# Patient Record
Sex: MALE | Race: BLACK OR AFRICAN AMERICAN | Employment: FULL TIME | URBAN - METROPOLITAN AREA
[De-identification: names, ages, dates, MRNs, and addresses within clinical notes are randomized per-mention and may not be internally consistent; named-entity substitution may affect disease eponyms.]

---

## 2018-09-10 ENCOUNTER — OFFICE VISIT (OUTPATIENT)
Dept: FAMILY MEDICINE | Facility: CLINIC | Age: 53
End: 2018-09-10
Payer: COMMERCIAL

## 2018-09-10 VITALS
OXYGEN SATURATION: 100 % | HEART RATE: 61 BPM | BODY MASS INDEX: 27.74 KG/M2 | WEIGHT: 183 LBS | RESPIRATION RATE: 18 BRPM | DIASTOLIC BLOOD PRESSURE: 74 MMHG | HEIGHT: 68 IN | TEMPERATURE: 98.8 F | SYSTOLIC BLOOD PRESSURE: 125 MMHG

## 2018-09-10 DIAGNOSIS — M25.531 RIGHT WRIST PAIN: Primary | ICD-10-CM

## 2018-09-10 DIAGNOSIS — Z23 NEED FOR PROPHYLACTIC VACCINATION WITH TETANUS-DIPHTHERIA (TD): ICD-10-CM

## 2018-09-10 PROCEDURE — 99214 OFFICE O/P EST MOD 30 MIN: CPT | Performed by: PHYSICIAN ASSISTANT

## 2018-09-10 RX ORDER — DICLOFENAC SODIUM 75 MG/1
75 TABLET, DELAYED RELEASE ORAL 2 TIMES DAILY PRN
Qty: 60 TABLET | Refills: 1 | Status: SHIPPED | OUTPATIENT
Start: 2018-09-10 | End: 2020-02-02

## 2018-09-10 NOTE — PROGRESS NOTES
"  SUBJECTIVE:                                                    Carlos Enrique Rousseau is a 53 year old male who presents to clinic today for the following health issues:    Wrist/hand Pain    Onset: x 2-3 weeks    Description:   Location: R hand/wrist  Character: Sharp, Dull ache and Stabbing    Intensity: moderate    Progression of Symptoms: worse    Accompanying Signs & Symptoms:  Other symptoms: numbness and swelling    History:   Previous similar pain: YES      Precipitating factors:   Trauma or overuse: YES- possibly unsure per pt maybe work?     Alleviating factors:  Improved by: rest/inactivity, heat, ice, immobilization, massage, acetaminophen and Ibuprofen    Therapies Tried and outcome: Noted above but really doesn't help with SX.        No known injury. Patient has had pain for a long time but now is so bad he had to miss work last Thursday.  Hurts a lot more with movement.  Has not used a brace.  Most pain is located in wrist towards the thumb. He feels nerve-type pain with \"shooting\" pains into his hand.  He feels his  strength is decreased also.  Needs not for work.  Is supposed to lift heavy things and would need a note to be able to wear a brace. Patient would like to see a specialist because the pain is so bad. He has been taking tylenol and advil.  He doesn't like taking medications so frequently, will have him stop advil and change to diclofenec.   [place]one [place] in the past year. No concerns.     No fevers,erythema or evidence of gout/infection.     Problem list and histories reviewed & adjusted, as indicated.  Additional history: as documented    Patient Active Problem List   Diagnosis     Thumb numbness     Past Surgical History:   Procedure Laterality Date     APPENDECTOMY         Social History   Substance Use Topics     Smoking status: Former Smoker     Packs/day: 0.50     Years: 10.00     Smokeless tobacco: Never Used     Alcohol use No     Family History   Problem Relation Age of " "Onset     Diabetes Mother      C.A.D. No family hx of      Hypertension No family hx of      Cerebrovascular Disease No family hx of          Current Outpatient Prescriptions   Medication Sig Dispense Refill     Acetaminophen (TYLENOL) 325 MG CAPS Take 325-650 mg by mouth every 4 hours as needed       diclofenac (VOLTAREN) 75 MG EC tablet Take 1 tablet (75 mg) by mouth 2 times daily as needed for moderate pain With food. 60 tablet 1     ibuprofen 200 MG capsule Take 200 mg by mouth every 4 hours as needed for fever       No Known Allergies    ROS:  Constitutional, HEENT, cardiovascular, pulmonary, GI, , musculoskeletal, neuro, skin, endocrine and psych systems are negative, except as otherwise noted.    OBJECTIVE:     /74  Pulse 61  Temp 98.8  F (37.1  C) (Oral)  Resp 18  Ht 5' 8\" (1.727 m)  Wt 183 lb (83 kg)  SpO2 100%  BMI 27.83 kg/m2  Body mass index is 27.83 kg/(m^2).  GENERAL: healthy, alert and no distress  RESP: lungs clear to auscultation - no rales, rhonchi or wheezes  CV: regular rate and rhythm, normal S1 S2, no S3 or S4, no murmur, click or rub, no peripheral edema and peripheral pulses strong  MS and neuro: {:tender R wrist in general.  No gross abnormalities except for one finger is malformed but per patient that was from an injury when he was 4 years old.   strength decreased right versus left. tinels negative for carpel tunnel. Unable to perform radial deviation at the wrist, ulnar is also painful for him. Cap refill and pulses and temp appropriate.  No crepitus alone dequervans region.   SKIN: no suspicious lesions or rashes      ASSESSMENT/PLAN:     ASSESSMENT / PLAN:  (M25.181) Right wrist pain  (primary encounter diagnosis)  Comment: carpel tunnel versus arthritis versus tendonitis or multiple issues also possible. Will have him wear brace (given today) to minimize movement at wrist and thumb.  He is to wear this as much as possible. Letter given for work. Scheduled with " specialist Friday.   Plan: ORTHOPEDICS ADULT REFERRAL, diclofenac         (VOLTAREN) 75 MG EC tablet--take with food side effects discussed                Billin min spent face-to-face with patient. 20 min on history, 4 on exam, 1 on discussing diagnosis and treatment plan.     Kayla Velazquez PA-C  [facility]

## 2018-09-10 NOTE — NURSING NOTE
"Chief Complaint   Patient presents with     Musculoskeletal Problem     R Hand/wrist pain per pt x 2-3 weeks no known injury     Health Maintenance     orders pended       Initial /74  Pulse 61  Temp 98.8  F (37.1  C) (Oral)  Resp 18  Ht 5' 8\" (1.727 m)  Wt 183 lb (83 kg)  SpO2 100%  BMI 27.83 kg/m2 Estimated body mass index is 27.83 kg/(m^2) as calculated from the following:    Height as of this encounter: 5' 8\" (1.727 m).    Weight as of this encounter: 183 lb (83 kg).  Medication Reconciliation: complete      Diane Contreras MA    "

## 2018-09-10 NOTE — MR AVS SNAPSHOT
After Visit Summary   9/10/2018    Carlos Enrique Rousseau    MRN: [id number]           Patient Information     Date Of Birth          1965        Visit Information        Provider Department      9/10/2018 1:00 PM Kayla Velazquez PA-C [facility]        Today's Diagnoses     Right wrist pain    -  1    Need for prophylactic vaccination with tetanus-diphtheria (TD)           Follow-ups after your visit        Additional Services     ORTHOPEDICS ADULT REFERRAL       Your provider has referred you to: [facility]: [facility] [phone]   [email or web address]    Please be aware that coverage of these services is subject to the terms and limitations of your health insurance plan.  Call member services at your health plan with any benefit or coverage questions.      Please bring the following to your appointment:    >>   Any x-rays, CTs or MRIs which have been performed.  Contact the facility where they were done to arrange for  prior to your scheduled appointment.    >>   List of current medications   >>   This referral request   >>   Any documents/labs given to you for this referral                  Your next 10 appointments already scheduled     Sep 14, 2018  8:15 AM CDT   New Visit with Clay Wooten MD   [facility] ([facility] [address] [address] [phone]              Who to contact     If you have questions or need follow up information about today's clinic visit or your schedule please contact [facility] directly at [phone].  Normal or non-critical lab and imaging results will be communicated to you by MyChart, letter or phone within 4 business days after the clinic has received the results. If you do not hear from us within 7 days, please contact the clinic through MyChart or phone. If you have a critical or abnormal lab result, we will notify you  "by phone as soon as possible.  Submit refill requests through [employer] or call your pharmacy and they will forward the refill request to us. Please allow 3 business days for your refill to be completed.          Additional Information About Your Visit        Care EveryWhere ID     This is your Care EveryWhere ID. This could be used by other organizations to access your [place] medical records  [id number]        Your Vitals Were     Pulse Temperature Respirations Height Pulse Oximetry BMI (Body Mass Index)    61 98.8  F (37.1  C) (Oral) 18 5' 8\" (1.727 m) 100% 27.83 kg/m2       Blood Pressure from Last 3 Encounters:   09/10/18 125/74   03/17/10 133/73    Weight from Last 3 Encounters:   09/10/18 183 lb (83 kg)   06/11/15 175 lb (79.4 kg)   03/17/10 163 lb (73.9 kg)              We Performed the Following     ORTHOPEDICS ADULT REFERRAL          Today's Medication Changes          These changes are accurate as of 9/10/18  1:29 PM.  If you have any questions, ask your nurse or doctor.               Start taking these medicines.        Dose/Directions    diclofenac 75 MG EC tablet   Commonly known as:  VOLTAREN   Used for:  Right wrist pain   Started by:  Kayla Velazquez PA-C        Dose:  75 mg   Take 1 tablet (75 mg) by mouth 2 times daily as needed for moderate pain With food.   Quantity:  60 tablet   Refills:  1            Where to get your medicines      These medications were sent to [facility] [address] [place] [facility] [address] [facility]  [address] [address]     Phone:  [phone]     diclofenac 75 MG EC tablet                Primary Care Provider Office Phone # Fax #    [facility] [phone] [address] [address] [address]        Equal Access to Services     EROS MARRERO : Kinza Buchanan, cintia love, manish saenz, claudia curtis. So [facility] [phone].    ATENCIÓN: Si habla " español, tiene a murphy disposición servicios gratuitos de asistencia lingüística. Fran epps [phone].    We comply with applicable federal civil rights laws and [place] laws. We do not discriminate on the basis of race, color, national origin, age, disability, sex, sexual orientation, or gender identity.            Thank you!     Thank you for choosing [facility]  for your care. Our goal is always to provide you with excellent care. Hearing back from our patients is one way we can continue to improve our services. Please take a few minutes to complete the written survey that you may receive in the mail after your visit with us. Thank you!             Your Updated Medication List - Protect others around you: Learn how to safely use, store and throw away your medicines at www.disposemymeds.org.          This list is accurate as of 9/10/18  1:29 PM.  Always use your most recent med list.                   Brand Name Dispense Instructions for use Diagnosis    diclofenac 75 MG EC tablet    VOLTAREN    60 tablet    Take 1 tablet (75 mg) by mouth 2 times daily as needed for moderate pain With food.    Right wrist pain       ibuprofen 200 MG capsule      Take 200 mg by mouth every 4 hours as needed for fever        TYLENOL 325 MG Caps   Generic drug:  Acetaminophen      Take 325-650 mg by mouth every 4 hours as needed

## 2018-09-10 NOTE — LETTER
[facility]  [address] [facility] [address]  Phone: [phone]    September 10, 2018        Carlos Enrique Rousseau  [address] [place] [address]          To whom it may concern:    RE: Carlos Enrique Rousseau    Patient was seen and treated today at our clinic and missed work for acute illness.  They may be excused Sept 6th (half day)  And today (full day).  He may return to work tomorrow but needs to wear a wrist support brace while working.  He should also not lift more than 25 pounds.  These restrictions are good until further evaluated by specialist.     Please contact me for questions or concerns.      Sincerely,        Kayla Velazquez PA-C

## 2018-09-14 ENCOUNTER — OFFICE VISIT (OUTPATIENT)
Dept: ORTHOPEDICS | Facility: CLINIC | Age: 53
End: 2018-09-14
Payer: COMMERCIAL

## 2018-09-14 ENCOUNTER — RADIANT APPOINTMENT (OUTPATIENT)
Dept: GENERAL RADIOLOGY | Facility: CLINIC | Age: 53
End: 2018-09-14
Attending: ORTHOPAEDIC SURGERY
Payer: COMMERCIAL

## 2018-09-14 VITALS
OXYGEN SATURATION: 97 % | HEIGHT: 68 IN | SYSTOLIC BLOOD PRESSURE: 130 MMHG | HEART RATE: 67 BPM | BODY MASS INDEX: 27.86 KG/M2 | WEIGHT: 183.8 LBS | DIASTOLIC BLOOD PRESSURE: 90 MMHG

## 2018-09-14 DIAGNOSIS — M25.531 RIGHT WRIST PAIN: ICD-10-CM

## 2018-09-14 DIAGNOSIS — M25.531 RIGHT WRIST PAIN: Primary | ICD-10-CM

## 2018-09-14 DIAGNOSIS — M19.031 ARTHRITIS OF RIGHT WRIST: ICD-10-CM

## 2018-09-14 PROCEDURE — 99203 OFFICE O/P NEW LOW 30 MIN: CPT | Mod: 25 | Performed by: ORTHOPAEDIC SURGERY

## 2018-09-14 PROCEDURE — 20605 DRAIN/INJ JOINT/BURSA W/O US: CPT | Mod: RT | Performed by: ORTHOPAEDIC SURGERY

## 2018-09-14 PROCEDURE — 73110 X-RAY EXAM OF WRIST: CPT | Mod: RT

## 2018-09-14 RX ORDER — IBUPROFEN 800 MG/1
800 TABLET, FILM COATED ORAL
COMMUNITY
Start: 2018-04-17

## 2018-09-14 RX ADMIN — TRIAMCINOLONE ACETONIDE 40 MG: 40 INJECTION, SUSPENSION INTRA-ARTICULAR; INTRAMUSCULAR at 09:02

## 2018-09-14 RX ADMIN — LIDOCAINE HYDROCHLORIDE 0.5 ML: 10 INJECTION, SOLUTION INFILTRATION; PERINEURAL at 09:02

## 2018-09-14 ASSESSMENT — PAIN SCALES - GENERAL: PAINLEVEL: EXTREME PAIN (9)

## 2018-09-14 NOTE — MR AVS SNAPSHOT
After Visit Summary   9/14/2018    aCrlos Enrique Rousseau    MRN: [id number]           Patient Information     Date Of Birth          1965        Visit Information        Provider Department      9/14/2018 8:15 AM Clay Wooten MD [facility]        Today's Diagnoses     Right wrist pain    -  1    Arthritis of right wrist           Follow-ups after your visit        Additional Services     [facility] PT, HAND, AND CHIROPRACTIC REFERRAL       **This order will print in the [facility] Scheduling Office**    Physical Therapy, Hand Therapy and Chiropractic Care are available through:    *[facility]  *[facility]  *[facility]    Call one number to schedule at any of the above locations: [phone].    Your provider has referred you to: Hand Therapy    Indication/Reason for Referral: Wrist Pain  Onset of Illness: years  Therapy Orders: Evaluate and Treat  Special Programs: None  Special Request: None    Diana Ortiz      Additional Comments for the Therapist or Chiropractor:     Please be aware that coverage of these services is subject to the terms and limitations of your health insurance plan.  Call member services at your health plan with any benefit or coverage questions.      Please bring the following to your appointment:    *Your personal calendar for scheduling future appointments  *Comfortable clothing                  Follow-up notes from your care team     Return if symptoms worsen or fail to improve.      Who to contact     If you have questions or need follow up information about today's clinic visit or your schedule please contact [facility] directly at [phone].  Normal or non-critical lab and imaging results will be communicated to you by MyChart, letter or phone within 4 business days after the clinic has received the results. If you do not hear from us within 7 days, please contact the clinic through [employer]  "or phone. If you have a critical or abnormal lab result, we will notify you by phone as soon as possible.  Submit refill requests through [employer] or call your pharmacy and they will forward the refill request to us. Please allow 3 business days for your refill to be completed.          Additional Information About Your Visit        Care EveryWhere ID     This is your Care EveryWhere ID. This could be used by other organizations to access your [place] medical records  [id number]        Your Vitals Were     Pulse Height Pulse Oximetry BMI (Body Mass Index)          67 5' 8\" (1.727 m) 97% 27.95 kg/m2         Blood Pressure from Last 3 Encounters:   09/14/18 130/90   09/10/18 125/74   03/17/10 133/73    Weight from Last 3 Encounters:   09/14/18 183 lb 12.8 oz (83.4 kg)   09/10/18 183 lb (83 kg)   06/11/15 175 lb (79.4 kg)              We Performed the Following     Arthrocentesis     [facility] PT, HAND, AND CHIROPRACTIC REFERRAL        Primary Care Provider Office Phone # Fax #    [facility] [phone] [address] [address] [facility] [address]        Equal Access to Services     EROS MARRERO : Hadii elise ku hadasho Soomaali, waaxda luqadaha, qaybta kaalmada adeegyada, claudia curtis. So [facility] [phone].    ATENCIÓN: Si habla español, tiene a murphy disposición servicios gratuitos de asistencia lingüística. Fran al [phone].    We comply with applicable federal civil rights laws and [place] laws. We do not discriminate on the basis of race, color, national origin, age, disability, sex, sexual orientation, or gender identity.            Thank you!     Thank you for choosing [facility]  for your care. Our goal is always to provide you with excellent care. Hearing back from our patients is one way we can continue to improve our services. Please take a few minutes to complete the written survey that you may receive in the mail after your visit with us. " Thank you!             Your Updated Medication List - Protect others around you: Learn how to safely use, store and throw away your medicines at www.disposemymeds.org.          This list is accurate as of 9/14/18 11:59 PM.  Always use your most recent med list.                   Brand Name Dispense Instructions for use Diagnosis    diclofenac 75 MG EC tablet    VOLTAREN    60 tablet    Take 1 tablet (75 mg) by mouth 2 times daily as needed for moderate pain With food.    Right wrist pain       * ibuprofen 200 MG capsule      Take 200 mg by mouth every 4 hours as needed for fever        * ibuprofen 800 MG tablet    ADVIL/MOTRIN     Take 800 mg by mouth        TYLENOL 325 MG Caps   Generic drug:  Acetaminophen      Take 325-650 mg by mouth every 4 hours as needed        * Notice:  This list has 2 medication(s) that are the same as other medications prescribed for you. Read the directions carefully, and ask your doctor or other care provider to review them with you.

## 2018-09-14 NOTE — PROGRESS NOTES
"Chief Complaint:   Chief Complaint   Patient presents with     Right Wrist - Pain     Onset: 6 months. NKI. Patient states his wrist is painful and will lock up. Sometimes the pain will affect his fingers. Sometimes he will have swelling to the point he cannot bend his wrist. He is wearing a thumb spica brace that helps.       Carlos Enrique Rousseau is seen today in the [facility] for evaluation of right wrist pain at the request of Kayla Velazquez PA-C        HPI: Carlos Enrique Rousseau is a 53 year old male , right -hand dominant, who presents for evaluation and management of a right wrist pain, no known injury. Pain has been present for 6 months. Since that time, pain has progressed. Pain will radiate into his hand and fingers. He will get swelling in the wrist and is stiff. At times he feels his wrist \"locks up\". Has now been wearing a brace x4 days that seems to help.       Per EMR, right wrist pain back in 2014 and noted to have significant arthritis at that time.      He reports having severe pain/discomfort around the wrist site. He denies associated numbness or tingling. He denies any other injuries to his upper extremity.   Symptoms: pain, swelling, stiffness.  Location of symptoms: wrist, fingers.  Pain severity: 9/10  Pain quality: sharp and shooting  Frequency of symptoms: are constant  Aggravating factors: activities, usage, work.  Relieving factors: wrist brace..    Previous treatment: none.    Past medical history:  has a past medical history of NO ACTIVE PROBLEMS.   Patient Active Problem List    Diagnosis Date Noted     Thumb numbness 06/11/2015     Priority: Medium       Past surgical history:  has a past surgical history that includes appendectomy.     Medications:    Current Outpatient Prescriptions   Medication Sig Dispense Refill     Acetaminophen (TYLENOL) 325 MG CAPS Take 325-650 mg by mouth every 4 hours as needed       diclofenac (VOLTAREN) 75 MG EC tablet Take 1 tablet (75 mg) " "by mouth 2 times daily as needed for moderate pain With food. 60 tablet 1     ibuprofen 200 MG capsule Take 200 mg by mouth every 4 hours as needed for fever          Allergies:   No Known Allergies     Family History: family history includes Diabetes in his mother. There is no history of C.A.D., Hypertension, or Cerebrovascular Disease.     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  reports that he has quit smoking. He has a 5.00 pack-year smoking history. He has never used smokeless tobacco. He reports that he does not drink alcohol or use illicit drugs.    Review of Systems:  ROS: 10 point ROS neg other than the symptoms noted above in the HPI and past medical history.    Physical Exam  GENERAL APPEARANCE: healthy, alert, no distress.   SKIN: no suspicious lesions or rashes  NEURO: Normal strength and tone, mentation intact and speech normal  PSYCH:  mentation appears normal and affect normal. Not anxious.  RESPIRATORY: No increased work of breathing.    /90  Pulse 67  Ht 5' 8\" (1.727 m)  Wt 183 lb 12.8 oz (83.4 kg)  SpO2 97%  BMI 27.95 kg/m2     right HAND / WRIST EXAM:    Skin intact. No abnormal skin discoloration, erythema or ecchymosis.   Normal wear pattern, color and tone.  Hyperextension deformity of the right long finger.    There is mild swelling in the wrist.  There is moderate tenderness in the dorsal wrist, carpometacarpal of the thumb.  Nontender to palpation 1st extensor compartment, ulnar fovea, distal ulna  There is no ecchymosis.  There is no erythema of the surrounding skin.  There is no maceration of the skin.  Range of motion: ~10 degrees extension and flexion (compared to >60 degrees left wrist)  Intact extensors. No extensor lag.    Special tests wrist:    Tinel's negative,    Phalen's negative.    Flexion/compression test negative.   Finkelstein's test: negative.   Ulnar piano sign: negative   Ulnar foveal tenderness:  negative    1st " carpometacarpal grind: positive     Intact sensation light touch median, radial, ulnar nerves of the hand  Intact sensation to the radial and ulnar digital nerves of the fingers, as well as the finger tips.  Intact epl fpl fdp edc wrist flexion/extension biceps/triceps deltoid  Brisk capillary refill to all fingers.   Palpable radial pulse, 2+.    X-rays:  3 views right wrist from 9/14/2018 were reviewed in clinic today. On my review, No obvious fracture or dislocation. No obvious bony abnormality or lesion. Mild soft tissue edema is noted diffusely about the right wrist. There is abnormal morphology of the lunate and triquetrum, with prominent spurring identified along the proximal margin. There may be new fusion of the capitate to the lunate on the AP View.  Marked spurring of the radiocarpal joint is noted and progressed, particularly the radial scaphoid. There is also at least moderate osteoarthritis of the triscaphe joint. This has progressed from xrays 11/21/2014.      Assessment: 52yo RHD male with chronic right wrist pain, advanced wrist osteoarthritis.    Plan:  * reviewed xrays showing arthritis, worse than 2014. Likely reason for decreased range of motion and pain, swelling.  * treatment with rest, ice, NSAIDS, activity modification, wrist brace (which seems to have helped using x4 days)  * injections discussed, risks and perceived benefits. Patient elects to proceed. See procedure note below.  * otherwise referral to hand surgeon to discuss other possibilities such as fusion, etc.  * hand therapy referral placed.  * return to clinic as needed.        Clay Wooten M.D., M.S.  Dept. of Orthopaedic Surgery  [facility]    Medium Joint Injection/Arthrocentesis  Date/Time: 9/14/2018 9:02 AM  Performed by: RADHA SIEGEL  Authorized by: CLAY WOOTEN     Indications:  Pain and joint swelling  Needle Size:  25 G  Guidance: surface landmarks    Approach:  Dorsal  Location:  Wrist  Site:   R radiocarpal  Medications:  0.5 mL lidocaine 1 %; 40 mg triamcinolone acetonide 40 MG/ML  Outcome:  Tolerated well, no immediate complications  Procedure discussed: discussed risks, benefits, and alternatives    Consent Given by:  Patient  Prep: patient was prepped and draped in usual sterile fashion

## 2018-09-14 NOTE — LETTER
"    9/14/2018         RE: Carlos Enrique Rousseau  [address] [facility] [address]        Dear Colleague,    Thank you for referring your patient, Carlos Enrique Rousseau, to the [facility]. Please see a copy of my visit note below.    Chief Complaint:   Chief Complaint   Patient presents with     Right Wrist - Pain     Onset: 6 months. NKI. Patient states his wrist is painful and will lock up. Sometimes the pain will affect his fingers. Sometimes he will have swelling to the point he cannot bend his wrist. He is wearing a thumb spica brace that helps.       Carlos Enrique Rousseau is seen today in the [facility] for evaluation of right wrist pain at the request of Kayla Velazquez PA-C        HPI: Carlos Enrique Rousseau is a 53 year old male , right -hand dominant, who presents for evaluation and management of a right wrist pain, no known injury. Pain has been present for 6 months. Since that time, pain has progressed. Pain will radiate into his hand and fingers. He will get swelling in the wrist and is stiff. At times he feels his wrist \"locks up\". Has now been wearing a brace x4 days that seems to help.       Per EMR, right wrist pain back in 2014 and noted to have significant arthritis at that time.      He reports having severe pain/discomfort around the wrist site. He denies associated numbness or tingling. He denies any other injuries to his upper extremity.   Symptoms: pain, swelling, stiffness.  Location of symptoms: wrist, fingers.  Pain severity: 9/10  Pain quality: sharp and shooting  Frequency of symptoms: are constant  Aggravating factors: activities, usage, work.  Relieving factors: wrist brace..    Previous treatment: none.    Past medical history:  has a past medical history of NO ACTIVE PROBLEMS.   Patient Active Problem List    Diagnosis Date Noted     Thumb numbness 06/11/2015     Priority: Medium       Past surgical history:  has a past surgical history that includes " "appendectomy.     Medications:    Current Outpatient Prescriptions   Medication Sig Dispense Refill     Acetaminophen (TYLENOL) 325 MG CAPS Take 325-650 mg by mouth every 4 hours as needed       diclofenac (VOLTAREN) 75 MG EC tablet Take 1 tablet (75 mg) by mouth 2 times daily as needed for moderate pain With food. 60 tablet 1     ibuprofen 200 MG capsule Take 200 mg by mouth every 4 hours as needed for fever          Allergies:   No Known Allergies     Family History: family history includes Diabetes in his mother. There is no history of C.A.D., Hypertension, or Cerebrovascular Disease.     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  reports that he has quit smoking. He has a 5.00 pack-year smoking history. He has never used smokeless tobacco. He reports that he does not drink alcohol or use illicit drugs.    Review of Systems:  ROS: 10 point ROS neg other than the symptoms noted above in the HPI and past medical history.    Physical Exam  GENERAL APPEARANCE: healthy, alert, no distress.   SKIN: no suspicious lesions or rashes  NEURO: Normal strength and tone, mentation intact and speech normal  PSYCH:  mentation appears normal and affect normal. Not anxious.  RESPIRATORY: No increased work of breathing.    /90  Pulse 67  Ht 5' 8\" (1.727 m)  Wt 183 lb 12.8 oz (83.4 kg)  SpO2 97%  BMI 27.95 kg/m2     right HAND / WRIST EXAM:    Skin intact. No abnormal skin discoloration, erythema or ecchymosis.   Normal wear pattern, color and tone.  Hyperextension deformity of the right long finger.    There is mild swelling in the wrist.  There is moderate tenderness in the dorsal wrist, carpometacarpal of the thumb.  Nontender to palpation 1st extensor compartment, ulnar fovea, distal ulna  There is no ecchymosis.  There is no erythema of the surrounding skin.  There is no maceration of the skin.  Range of motion: ~10 degrees extension and flexion (compared to >60 degrees left " wrist)  Intact extensors. No extensor lag.    Special tests wrist:    Tinel's negative,    Phalen's negative.    Flexion/compression test negative.   Finkelstein's test: negative.   Ulnar piano sign: negative   Ulnar foveal tenderness:  negative    1st carpometacarpal grind: positive     Intact sensation light touch median, radial, ulnar nerves of the hand  Intact sensation to the radial and ulnar digital nerves of the fingers, as well as the finger tips.  Intact epl fpl fdp edc wrist flexion/extension biceps/triceps deltoid  Brisk capillary refill to all fingers.   Palpable radial pulse, 2+.    X-rays:  3 views right wrist from 9/14/2018 were reviewed in clinic today. On my review, No obvious fracture or dislocation. No obvious bony abnormality or lesion. Mild soft tissue edema is noted diffusely about the right wrist. There is abnormal morphology of the lunate and triquetrum, with prominent spurring identified along the proximal margin. There may be new fusion of the capitate to the lunate on the AP View.  Marked spurring of the radiocarpal joint is noted and progressed, particularly the radial scaphoid. There is also at least moderate osteoarthritis of the triscaphe joint. This has progressed from xrays 11/21/2014.      Assessment: 52yo RHD male with chronic right wrist pain, advanced wrist osteoarthritis.    Plan:  * reviewed xrays showing arthritis, worse than 2014. Likely reason for decreased range of motion and pain, swelling.  * treatment with rest, ice, NSAIDS, activity modification, wrist brace (which seems to have helped using x4 days)  * injections discussed, risks and perceived benefits. Patient elects to proceed. See procedure note below.  * otherwise referral to hand surgeon to discuss other possibilities such as fusion, etc.  * hand therapy referral placed.  * return to clinic as needed.        Clay Wooten M.D., M.S.  Dept. of Orthopaedic Surgery  [facility]  Injection/Arthrocentesis  Date/Time: 9/14/2018 9:02 AM  Performed by: RADHA SIEGEL  Authorized by: JOSEPH WOOTEN     Indications:  Pain and joint swelling  Needle Size:  25 G  Guidance: surface landmarks    Approach:  Dorsal  Location:  Wrist  Site:  R radiocarpal  Medications:  0.5 mL lidocaine 1 %; 40 mg triamcinolone acetonide 40 MG/ML  Outcome:  Tolerated well, no immediate complications  Procedure discussed: discussed risks, benefits, and alternatives    Consent Given by:  Patient  Prep: patient was prepped and draped in usual sterile fashion                    Again, thank you for allowing me to participate in the care of your patient.        Sincerely,        Joseph Wooten MD

## 2018-09-17 RX ORDER — TRIAMCINOLONE ACETONIDE 40 MG/ML
40 INJECTION, SUSPENSION INTRA-ARTICULAR; INTRAMUSCULAR
Status: DISCONTINUED | OUTPATIENT
Start: 2018-09-14 | End: 2020-02-02

## 2018-09-17 RX ORDER — LIDOCAINE HYDROCHLORIDE 10 MG/ML
0.5 INJECTION, SOLUTION INFILTRATION; PERINEURAL
Status: DISCONTINUED | OUTPATIENT
Start: 2018-09-14 | End: 2020-02-02

## 2019-05-23 ENCOUNTER — OFFICE VISIT (OUTPATIENT)
Dept: URGENT CARE | Facility: URGENT CARE | Age: 54
End: 2019-05-23
Payer: COMMERCIAL

## 2019-05-23 VITALS
SYSTOLIC BLOOD PRESSURE: 131 MMHG | HEART RATE: 75 BPM | BODY MASS INDEX: 28.13 KG/M2 | DIASTOLIC BLOOD PRESSURE: 83 MMHG | TEMPERATURE: 97.3 F | OXYGEN SATURATION: 98 % | WEIGHT: 185 LBS

## 2019-05-23 DIAGNOSIS — H93.8X1 EAR SWELLING, RIGHT: Primary | ICD-10-CM

## 2019-05-23 PROCEDURE — 99213 OFFICE O/P EST LOW 20 MIN: CPT | Performed by: FAMILY MEDICINE

## 2019-05-23 RX ORDER — CEPHALEXIN 500 MG/1
500 CAPSULE ORAL 4 TIMES DAILY
Qty: 20 CAPSULE | Refills: 0 | Status: SHIPPED | OUTPATIENT
Start: 2019-05-23 | End: 2019-05-28

## 2019-05-23 RX ORDER — LIDOCAINE 50 MG/G
OINTMENT TOPICAL
Qty: 5 G | Refills: 2 | Status: SHIPPED | OUTPATIENT
Start: 2019-05-23

## 2019-05-23 ASSESSMENT — ENCOUNTER SYMPTOMS
DIAPHORESIS: 0
CHILLS: 0
DIARRHEA: 0
COUGH: 0
NAUSEA: 0
FEVER: 0
RHINORRHEA: 0
SORE THROAT: 0
SHORTNESS OF BREATH: 0
VOMITING: 0

## 2019-05-23 NOTE — PROGRESS NOTES
"SUBJECTIVE:   Carlos Enrique Rousseau is a 53 year old male presenting with a chief complaint of   Chief Complaint   Patient presents with     Ear Problem     Cyst in right ear that is very painful x 1 day.        24 hours of external ear canal focal erythema and swelling near tragus of the ear or outer ear canal.   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     Does also according to his wife who is present \"use Q tips very often\"      Review of Systems   Constitutional: Negative for chills, diaphoresis and fever.   HENT: Negative for congestion, ear pain, rhinorrhea and sore throat.    Respiratory: Negative for cough and shortness of breath.    Gastrointestinal: Negative for diarrhea, nausea and vomiting.     Patient Active Problem List   Diagnosis     Thumb numbness      Past Medical History:   Diagnosis Date     NO ACTIVE PROBLEMS      Family History   Problem Relation Age of Onset     Diabetes Mother      C.A.D. No family hx of      Hypertension No family hx of      Cerebrovascular Disease No family hx of      Current Outpatient Medications   Medication Sig Dispense Refill     cephALEXin (KEFLEX) 500 MG capsule Take 1 capsule (500 mg) by mouth 4 times daily for 5 days 20 capsule 0     ibuprofen (ADVIL/MOTRIN) 800 MG tablet Take 800 mg by mouth       lidocaine (XYLOCAINE) 5 % external ointment Apply topically every 3 hours as needed for moderate pain Apply to outer ear as directed for up to 2 days. Follow-up if worsening. 5 g 2     Acetaminophen (TYLENOL) 325 MG CAPS Take 325-650 mg by mouth every 4 hours as needed       diclofenac (VOLTAREN) 75 MG EC tablet Take 1 tablet (75 mg) by mouth 2 times daily as needed for moderate pain With food. (Patient not taking: Reported on 9/14/2018) 60 tablet 1     ibuprofen 200 MG capsule Take 200 mg by mouth every 4 hours as needed for fever       Social History     Tobacco Use     Smoking status: Former Smoker     Packs/day: 0.50     Years: " 10.00     Pack years: 5.00     Smokeless tobacco: Never Used   Substance Use Topics     Alcohol use: No       OBJECTIVE  /83   Pulse 75   Temp 97.3  F (36.3  C) (Tympanic)   Wt 83.9 kg (185 lb)   SpO2 98%   BMI 28.13 kg/m      Physical Exam   Constitutional: He is oriented to person, place, and time.   HENT:   Head: Normocephalic and atraumatic.   Right Ear: Hearing and tympanic membrane normal.   Left Ear: Hearing, tympanic membrane, external ear and ear canal normal.   Nose: Nose normal.   Mouth/Throat: Oropharynx is clear and moist. No oropharyngeal exudate.   Noted focal lateral right external ear canal swelling without fluctuance and not amenable to drainage.   Tender with even soft pressure from otoscopic spec        Eyes: Pupils are equal, round, and reactive to light. Conjunctivae are normal. Right eye exhibits no discharge. Left eye exhibits no discharge. No scleral icterus.   Neck: Neck supple. No tracheal deviation present. No thyromegaly present.   Cardiovascular: Normal rate, regular rhythm, normal heart sounds and intact distal pulses. Exam reveals no gallop and no friction rub.   No murmur heard.  Pulmonary/Chest: Effort normal and breath sounds normal. No stridor. No respiratory distress. He has no wheezes. He has no rales. He exhibits no tenderness.   Abdominal: Soft. Bowel sounds are normal. He exhibits no distension and no mass. There is no tenderness. There is no rebound and no guarding.   Musculoskeletal: He exhibits no edema, tenderness or deformity.   Lymphadenopathy:     He has no cervical adenopathy.   Neurological: He is alert and oriented to person, place, and time. No cranial nerve deficit.   Skin: Skin is warm and dry. No rash noted. No erythema.   Psychiatric: Judgment normal.         ASSESSMENT:      ICD-10-CM    1. Ear swelling, right H93.8X1 lidocaine (XYLOCAINE) 5 % external ointment     cephALEXin (KEFLEX) 500 MG capsule      PLAN  Consider follow-up I+D if more painful  or getting larger over the next 24-48 hours.   Trial of topical lidocaine as above. Trial of warm compresses and keflex abx.   The patient indicates understanding of these issues and agrees with the plan.   Patient educational/instructional material provided including reasons for follow-up   hCemo Luna MD

## 2020-02-02 ENCOUNTER — OFFICE VISIT (OUTPATIENT)
Dept: URGENT CARE | Facility: URGENT CARE | Age: 55
End: 2020-02-02
Payer: COMMERCIAL

## 2020-02-02 ENCOUNTER — ANCILLARY PROCEDURE (OUTPATIENT)
Dept: GENERAL RADIOLOGY | Facility: CLINIC | Age: 55
End: 2020-02-02
Attending: NURSE PRACTITIONER
Payer: COMMERCIAL

## 2020-02-02 VITALS
OXYGEN SATURATION: 97 % | SYSTOLIC BLOOD PRESSURE: 149 MMHG | TEMPERATURE: 97 F | DIASTOLIC BLOOD PRESSURE: 85 MMHG | HEART RATE: 73 BPM

## 2020-02-02 DIAGNOSIS — R05.9 COUGH: ICD-10-CM

## 2020-02-02 DIAGNOSIS — J06.9 VIRAL UPPER RESPIRATORY TRACT INFECTION: Primary | ICD-10-CM

## 2020-02-02 PROCEDURE — 71046 X-RAY EXAM CHEST 2 VIEWS: CPT

## 2020-02-02 PROCEDURE — 99213 OFFICE O/P EST LOW 20 MIN: CPT | Performed by: NURSE PRACTITIONER

## 2020-02-02 NOTE — LETTER
[facility]  [address] [address]  Phone: [phone]    February 2, 2020        Carlos Enrique MAXIMO Rousseau  [address] [facility] [address]          To whom it may concern:    RE: Carlos Enriquededra Faganable    Patient was seen and treated today at our clinic and missed work 1/30, 1/31, and 2/3 due to illness    Please contact me for questions or concerns.      Sincerely,        ALBERT Navas CNP

## 2020-02-02 NOTE — PROGRESS NOTES
SUBJECTIVE:   Carlos Enrique Rousseau is a 54 year old male presenting with a chief complaint of cough   Onset of symptoms was 1 week(s) ago.  Course of illness is waxing and waning.    Severity moderate  Current and Associated symptoms: has had back pain (upper) still has, fever chills that are now gone  Treatment measures tried include Tylenol/Ibuprofen, Fluids and Rest.  Predisposing factors include None.    Past Medical History:   Diagnosis Date     NO ACTIVE PROBLEMS      Current Outpatient Medications   Medication Sig Dispense Refill     Acetaminophen (TYLENOL) 325 MG CAPS Take 325-650 mg by mouth every 4 hours as needed       diclofenac (VOLTAREN) 75 MG EC tablet Take 1 tablet (75 mg) by mouth 2 times daily as needed for moderate pain With food. (Patient not taking: Reported on 9/14/2018) 60 tablet 1     ibuprofen (ADVIL/MOTRIN) 800 MG tablet Take 800 mg by mouth       ibuprofen 200 MG capsule Take 200 mg by mouth every 4 hours as needed for fever       lidocaine (XYLOCAINE) 5 % external ointment Apply topically every 3 hours as needed for moderate pain Apply to outer ear as directed for up to 2 days. Follow-up if worsening. 5 g 2     Social History     Tobacco Use     Smoking status: Former Smoker     Packs/day: 0.50     Years: 10.00     Pack years: 5.00     Smokeless tobacco: Never Used   Substance Use Topics     Alcohol use: No       ROS:  Review of systems negative except as stated above.    OBJECTIVE:  BP (!) 149/85   Pulse 73   Temp 97  F (36.1  C) (Tympanic)   SpO2 97%   GENERAL APPEARANCE: Alert and no distress  EYES: EOMI,  PERRL, conjunctiva clear  HENT: ear canals and TM's normal.  Nose and mouth without ulcers, erythema or lesions  NECK: supple, nontender, no lymphadenopathy  RESP: lungs clear to auscultation - no rales, rhonchi or wheezes  CV: regular rates and rhythm, normal S1 S2, no murmur noted  ABDOMEN:  soft, nontender, no HSM or masses and bowel sounds normal  NEURO: Normal strength and tone,  sensory exam grossly normal,  normal speech and mentation  SKIN: no suspicious lesions or rashes  PSYCH: mentation appears normal  LYMPHATICS: no cervical adenopathy    Xray chest negative for infiltrates or pneumonia    ASSESSMENT:  Viral upper respiratory illness    PLAN:  Fluids, Rest, OTC cough suppressant/expectorant and Vaporizer  Home treat and monitor symptoms call or rtc if new worsening      Carlos Enrique to follow up with Primary Care provider regarding elevated blood pressure.      ALBERT Navas CNP

## 2021-04-17 ENCOUNTER — OFFICE VISIT (OUTPATIENT)
Dept: URGENT CARE | Facility: URGENT CARE | Age: 56
End: 2021-04-17

## 2021-04-17 VITALS
HEART RATE: 82 BPM | DIASTOLIC BLOOD PRESSURE: 83 MMHG | TEMPERATURE: 98 F | OXYGEN SATURATION: 96 % | SYSTOLIC BLOOD PRESSURE: 151 MMHG

## 2021-04-17 DIAGNOSIS — M25.562 POSTERIOR LEFT KNEE PAIN: Primary | ICD-10-CM

## 2021-04-17 PROCEDURE — 99213 OFFICE O/P EST LOW 20 MIN: CPT | Performed by: PHYSICIAN ASSISTANT

## 2021-04-17 NOTE — PROGRESS NOTES
Assessment & Plan     1. Posterior left knee pain     R/O Baker's cyst vs strain vs DVT    He will go now to ED for probable US of left knee area/Doppler.               Tom Enriquez PA-C  [facility]    Addie Monaco is a 55 year old male who presents to clinic today for the following health issues:  Chief Complaint   Patient presents with     Knee Pain     left knee pain started after covid shot 04/09     HPI    Pain to left knee for 8 days. No swelling or redness. Ibuprofen and tylenol have not helped. Never before did he have this kind of pain. No fever, chills, or sweats. Sitting minimal pain. Then the pain worsened over the past few days with increased pain upon walking and standing. On feet all the time for work. Active sandeep. No recent travel. No recent surgery, immobilization, or history of DVT.           Review of Systems        Objective    BP (!) 151/83   Pulse 82   Temp 98  F (36.7  C) (Tympanic)   SpO2 96%   Physical Exam  Musculoskeletal:        Legs:

## 2021-08-17 ENCOUNTER — OFFICE VISIT (OUTPATIENT)
Dept: URGENT CARE | Facility: URGENT CARE | Age: 56
End: 2021-08-17

## 2021-08-17 VITALS
TEMPERATURE: 97.7 F | BODY MASS INDEX: 28.89 KG/M2 | HEART RATE: 71 BPM | WEIGHT: 190 LBS | DIASTOLIC BLOOD PRESSURE: 93 MMHG | OXYGEN SATURATION: 98 % | SYSTOLIC BLOOD PRESSURE: 162 MMHG

## 2021-08-17 DIAGNOSIS — G89.29 CHRONIC PAIN OF LEFT KNEE: Primary | ICD-10-CM

## 2021-08-17 DIAGNOSIS — M25.562 CHRONIC PAIN OF LEFT KNEE: Primary | ICD-10-CM

## 2021-08-17 PROCEDURE — 99213 OFFICE O/P EST LOW 20 MIN: CPT | Performed by: NURSE PRACTITIONER

## 2021-08-17 ASSESSMENT — PAIN SCALES - GENERAL: PAINLEVEL: SEVERE PAIN (6)

## 2021-08-17 NOTE — LETTER
August 17, 2021      Carlos Enrique Rousseau  [address] [facility] [place] [address]        To Whom It May Concern:    Carlos Enriquededra Rousseau was seen on 8/17/21.  Please excuse him from work on 8/16/21 due to knee pain.        Sincerely,        Laura Fletcher, NP

## 2021-08-18 NOTE — PROGRESS NOTES
Assessment & Plan     Chronic pain of left knee       Reviewed orthopedic appointment note from 8/2/21 and verify physical therapy is scheduled for 8/26/21. Letter given for work excusing him yesterday. Keep orthopedic appointment on Friday, f/u sooner if symptoms worsen. He declines any workup with xray or evaluation today.     All questions were answered and patient verbalized understanding.      Laura Fltecher, DNP, APRN, CNP 8/17/2021 7:30 PM  [facility] [place]          Addie Monaco is a 56 year old male who presents to clinic today for the following health issues:  Chief Complaint   Patient presents with     Musculoskeletal Problem     left leg pain on going        MS Injury/Pain    Patient presents requesting a letter for work for yesterday. He has a history of chronic left knee pain and arthritis in his knee. He states he was given options of physical therapy, cortisone injection, surgery. He would like to do physical therapy but isn't able to get into physical therapy until 8/26, which is scheduled. He will be going to see his orthopedic doctor this Friday, 8/20/21. He would like a letter for work. He works as a . He declines any work-up or evaluation today including xrays.       Problem list, Medication list, Allergies, and Medical history reviewed in EPIC.    ROS:  Review of systems negative except for noted above        Objective    BP (!) 162/93 (BP Location: Left arm, Patient Position: Chair, Cuff Size: Adult Regular)   Pulse 71   Temp 97.7  F (36.5  C) (Tympanic)   Wt 86.2 kg (190 lb)   SpO2 98%   BMI 28.89 kg/m    Physical Exam  Constitutional:       General: He is not in acute distress.     Appearance: He is not toxic-appearing or diaphoretic.   Neurological:      Mental Status: He is alert.      Gait: Gait abnormal.      Comments: Walking with a limp favoring left leg

## 2022-01-24 ENCOUNTER — OFFICE VISIT (OUTPATIENT)
Dept: URGENT CARE | Facility: URGENT CARE | Age: 57
End: 2022-01-24

## 2022-01-24 VITALS
BODY MASS INDEX: 29.77 KG/M2 | DIASTOLIC BLOOD PRESSURE: 72 MMHG | SYSTOLIC BLOOD PRESSURE: 132 MMHG | WEIGHT: 195.8 LBS | HEART RATE: 84 BPM | OXYGEN SATURATION: 100 % | TEMPERATURE: 98.1 F
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
## 2022-01-24 DIAGNOSIS — M54.50 ACUTE MIDLINE LOW BACK PAIN WITHOUT SCIATICA: Primary | ICD-10-CM

## 2022-01-24 DIAGNOSIS — M25.512 ACUTE PAIN OF LEFT SHOULDER: ICD-10-CM

## 2022-01-24 PROCEDURE — 99214 OFFICE O/P EST MOD 30 MIN: CPT | Performed by: NURSE PRACTITIONER

## 2022-01-24 RX ORDER — CYCLOBENZAPRINE HCL 5 MG
5 TABLET ORAL 3 TIMES DAILY PRN
Qty: 20 TABLET | Refills: 0 | Status: SHIPPED | OUTPATIENT
Start: 2022-01-24

## 2022-01-24 RX ORDER — NAPROXEN 500 MG/1
500 TABLET ORAL 2 TIMES DAILY WITH MEALS
Qty: 20 TABLET | Refills: 0 | Status: SHIPPED | OUTPATIENT
Start: 2022-01-24

## 2022-01-24 NOTE — PROGRESS NOTES
Assessment & Plan     Acute midline low back pain without sciatica    - naproxen (NAPROSYN) 500 MG tablet  Dispense: 20 tablet; Refill: 0  - cyclobenzaprine (FLEXERIL) 5 MG tablet  Dispense: 20 tablet; Refill: 0    Acute pain of left shoulder    Fall, initial encounter       Discussed most acute back pain resolves in 3-4 weeks. Recommended rest, walking, gentle stretching, ice, heat, salonpas over the counter patch or cream, tylenol as needed. Prescription sent to pharmacy for Naproxen 500 mg every 12 hours with food for 7-10 days, do not take with other NSAIDs. Prescription sent to pharmacy for Cyclobenzaprine 5-10 mg every 8 hours as needed, potential side effects discussed, do not take before driving or operating machinery. No red flag signs currently. Patient neurologically stable currently. Xray not indicated of shoulder or spine without bony tenderness currently. Letter given for work.     Follow-up with PCP if symptoms persist for 7 days, and sooner if symptoms worsen or new symptoms develop.     Discussed red flag symptoms which warrant immediate visit in emergency room    All questions were answered and patient verbalized understanding. AVS reviewed with patient.     Laura Fletcher, DNP, APRN, CNP 1/24/2022 12:27 PM  [facility]          dAdie Monaco is a 56 year old male who presents to clinic today for the following health issues:  Chief Complaint   Patient presents with     Fall     MS Injury/Pain    Onset of symptoms was 2 day(s) ago.  Location: left shoulder and midline low back which does not radiate  Context: The injury happened while falling on ice landing on low back and left shoulder  Denies fecal incontinence, urinary incontinence, lower extremity numbness, lower extremity weakness and paresthesia, chest pain, shortness of breath  Course of symptoms is worsening after having to shovel more    Severity moderate  Current and Associated symptoms: Pain  Denies   Swelling, Bruising, Warmth, Redness and Tenderness  Aggravating Factors: movement  Therapies to improve symptoms include: 650 mg tylenol and 400 mg ibuprofen helps temporarily, last had 2 hours ago  This is the first time this type of problem has occurred for this patient.   He missed work today due to pain and would like a letter for work.   No history of kidney disease.     Problem list, Medication list, Allergies, and Medical history reviewed in EPIC.    ROS:  Review of systems negative except for noted above        Objective    /72   Pulse 84   Temp 98.1  F (36.7  C) (Oral)   Wt 88.8 kg (195 lb 12.8 oz)   SpO2 100%   BMI 29.77 kg/m    Physical Exam  Constitutional:       General: He is not in acute distress.     Appearance: He is not toxic-appearing or diaphoretic.   Cardiovascular:      Rate and Rhythm: Normal rate and regular rhythm.      Heart sounds: Normal heart sounds.   Pulmonary:      Effort: Pulmonary effort is normal. No respiratory distress.      Breath sounds: Normal breath sounds. No wheezing, rhonchi or rales.   Abdominal:      Tenderness: There is no right CVA tenderness or left CVA tenderness.   Musculoskeletal:      Right shoulder: Normal.      Left shoulder: No swelling or tenderness. Normal range of motion. Normal strength.      Comments: No spinal or paraspinal tenderness with palpation. Negative straight leg raise bilaterally. Full ROM lumbar flexion, extension, lateral flexion, twisting. Increased pain with empty popcan test left shoulder   Skin:     General: Skin is warm and dry.      Findings: No bruising or erythema.   Neurological:      General: No focal deficit present.      Mental Status: He is alert and oriented to person, place, and time.      Gait: Gait normal.      Comments: Able to walk on heels, toes, straight line walk

## 2022-01-24 NOTE — PATIENT INSTRUCTIONS
Rest, walking, gentle stretching, ice, heat, salonpas over the counter patch or cream, tylenol as needed.     Prescription sent to pharmacy for Naproxen 500 mg every 12 hours with food for 7-10 days, do not take with other NSAIDs.     Prescription sent to pharmacy for Cyclobenzaprine 5-10 mg every 8 hours as needed, do not take before driving or operating machinery.       Patient Education     Back Care Tips     Caring for your back  These are things you can do to prevent a recurrence of acute back pain and to reduce symptoms from chronic back pain:    Stay at a healthy weight. If you are overweight, losing weight will help most types of back pain.    Exercise is an important part of recovery from most types of back pain. The muscles behind and in front of the spine support the back. This means strengthening both the back muscles and the abdominal muscles will provide better support for your spine.     Swimming and brisk walking are good overall exercises to improve your fitness level.    Practice safe lifting methods (see below).    Practice good posture when sitting, standing, and walking. Don't sit for a long time. This puts more stress on the lower back than standing or walking.    Wear quality shoes with good arch support. Foot and ankle alignment can affect back symptoms. Don't wear high heels.    Therapeutic massage can help relax the back muscles without stretching them.    During the first 24 to 72 hours after an acute injury or flare-up of chronic back pain, put an ice pack on the painful area for 20 minutes and then remove it for 20 minutes. Do thisover a period of 60 to 90 minutes, or several times a day. As a safety precaution, don't use a heating pad at bedtime. Sleeping on a heating pad can lead to skin burns or tissue damage.    You can alternate using ice and heat.  Medicines  Talk with your healthcare provider before using medicines, especially if you have other health problems or are taking other  medicines.    You may use over-the-counter medicines, such as acetaminophen, ibuprofen, or naprosyn to control pain, unless your healthcare provider prescribed other pain medicine. Talk with your healthcare provider before taking any medicines if you have a chronic condition such as diabetes, liver or kidney disease, stomach ulcers, or digestive bleeding, or are taking blood thinners.    Be careful if you are given prescription pain medicines, opioids, or medicine for muscle spasm. They can cause drowsiness, and affect your coordination, reflexes, and judgment. Don't drive or operate heavy machinery while taking these types of medicines. Take prescription pain medicine only as prescribed by your healthcare provider.  Lumbar stretch  This simple stretch will help relax muscle spasm and keep your back more limber. If exercise makes your back pain worse, don t do it.    Lie on your back with your knees bent and both feet on the ground.    Slowly raise your left knee to your chest as you flatten your lower back against the floor. Hold for 5 seconds.    Relax and repeat the exercise with your right knee.    Do 10 of these exercises for each leg.  Safe lifting method    Don t bend over at the waist to lift an object off the floor.  Instead, bend your knees and hips in a squat.     Keep your back and head upright    Hold the object close to your body, directly in front of you.    Straighten your legs to lift the object.     Lower the object to the floor in the reverse fashion.    If you must slide something across the floor, push it.    Posture tips  Sitting  Sit in chairs with straight backs or low-back support. Keep your knees lower than your hips, with your feet flat on the floor.  When driving, sit up straight. Adjust the seat forward so you are not leaning toward the steering wheel.  A small pillow or rolled towel behind your lower back may help if you are driving long distances.   Standing  When standing for long  periods, shift most of your weight to one leg at a time. Switch legs every few minutes.   Sleeping  The best way to sleep is on your side with your knees bent. Put a low pillow under your head to support your neck in a neutral spine position. Don't use thick pillows that bend your neck to one side. Put a pillow between your legs to further relax your lower back. If you sleep on your back, put pillows under your knees to support your legs in a slightly flexed position. Use a firm mattress. If your mattress sags, replace it, or use a 1/2-inch plywood board under the mattress to add support.  Follow-up care  Follow up with your healthcare provider, or as advised.  If X-rays, a CT scan or an MRI scan were taken, they may be reviewed by a radiologist. You will be told of any new findings that may affect your care.  Call 911  Call 911 if any of the following occur:    Trouble breathing    Confusion    Very drowsy    Fainting or loss of consciousness    Rapid or very slow heart rate    Loss of  bowel or bladder control  When to seek medical advice  Call your healthcare provider right away if any of the following occur:    Pain becomes worse or spreads to your arms or legs    Weakness or numbness in one or both arms or legs    Numbness in the groin area  [employer] last reviewed this educational content on 11/1/2019 2000-2021 [employer]. All rights reserved. This information is not intended as a substitute for professional medical care. Always follow your healthcare professional's instructions.           Patient Education     Shoulder Pain with Uncertain Cause   Shoulder pain can have many causes. Pain often comes from the structures that surround the shoulder joint. These are the joint capsule, ligaments, tendons, muscles, and bursa. Pain can also come from cartilage in the joint. Cartilage can become worn out or injured. It s important to know what s causing your pain so the healthcare provider can use the  correct treatment. But sometimes it s difficult to find the exact cause of shoulder pain. You may need to see a specialist (orthopedist). You may also need special tests such as a CT scan or MRI. The provider may need to use special tools to look inside the joint (arthroscopy).  Shoulder pain can be treated with a sling or a device that keeps your shoulder from moving. You can take an anti-inflammatory medicine such as ibuprofen to ease pain. You may need to do special shoulder exercises. Follow up with a specialist if the pain is severe or doesn t go away after a few weeks.  Home care  Follow these tips when caring for yourself at home:    If a sling was given to you, leave it in place for the time advised by your healthcare provider. If you aren t sure how long to wear it, ask for advice. If the sling becomes loose, adjust it so that your forearm is level with the ground. Your shoulder should feel well supported.    Put an ice pack on the injured area for 20 minutes every 1 to 2 hours the first day. You can make your own ice pack by putting ice cubes in a plastic bag. Wrap the bag in a thin towel. Continue with ice packs 3 to 4 times a day for the next 2 days. Then use the pack as needed to ease pain and swelling.    You may use acetaminophen or ibuprofen to control pain, unless another pain medicine was prescribed. If you have chronic liver or kidney disease, talk with your healthcare provider before using these medicines. Also talk with your provider if you ve ever had a stomach ulcer or digestive bleeding.    Shoulder pain may seem worse at night, when there is less to distract you from the pain. If you sleep on your side, try to keep weight off your painful shoulder. Propping pillows behind you may stop you from rolling over onto that shoulder during sleep.     Shoulder and elbow joints can become stiff if left in a sling for too long. You should start range of motion exercises about 7 to 10 days after the  injury. Talk with your provider to find out what type of exercises to do and how soon to start.    You can take the sling off to shower or bathe.  Follow-up care  Follow up with your healthcare provider if you don t start to get better in the next 5 days.  When to seek medical advice  Call your healthcare provider right away if any of these occur:    Pain or swelling gets worse or continues for more than a few days    Your hand or fingers become cold, blue, numb, or tingly    Large amount of bruising on your shoulder or upper arm    Trouble moving your hand or fingers    Weakness in your hand or fingers    Your shoulder becomes stiff    It feels like your shoulder is popping out    You are less able to do your daily activities  [employer] last reviewed this educational content on 1/1/2020 2000-2021 [employer]. All rights reserved. This information is not intended as a substitute for professional medical care. Always follow your healthcare professional's instructions.

## 2022-01-24 NOTE — LETTER
January 24, 2022      Carlos Enriquededra Rousseau  [address] [facility] [place] [address]        To Whom It May Concern:    Carlos Enriquededra Rousseau was seen on 1/24/22.  Please excuse him from work today due to injury.        Sincerely,        ELLIOT Sanchez